# Patient Record
Sex: MALE | URBAN - METROPOLITAN AREA
[De-identification: names, ages, dates, MRNs, and addresses within clinical notes are randomized per-mention and may not be internally consistent; named-entity substitution may affect disease eponyms.]

---

## 2020-10-30 ENCOUNTER — OFFICE VISIT (OUTPATIENT)
Dept: URGENT CARE | Age: 20
End: 2020-10-30

## 2020-10-30 VITALS — TEMPERATURE: 98.5 F | HEART RATE: 86 BPM | OXYGEN SATURATION: 99 % | RESPIRATION RATE: 15 BRPM

## 2020-10-30 DIAGNOSIS — R68.89 FLU-LIKE SYMPTOMS: ICD-10-CM

## 2020-10-30 DIAGNOSIS — Z20.822 COVID-19 RULED OUT: Primary | ICD-10-CM

## 2020-10-30 NOTE — PROGRESS NOTES
10/30/2020    Halle Baum (:  2000) is a 23 y.o.  male, here requesting COVID-19 testing    History of Present Illness  Cough    Visit Vitals  Pulse 86   Temp 98.5 °F (36.9 °C)   Resp 15   SpO2 99%       ASSESSMENT  Screening for COVID-19/ Viral disease    PLAN  POCT influenza testing - Comments: pending,   COVID-19 sample collected and submitted. Patient given detailed CDC instructions contained within After Visit Summary. An  electronic signature was used to authenticate this note.     --Franck Sosa MD

## 2020-10-31 LAB
FLUAV+FLUBV AG NOSE QL IA.RAPID: NEGATIVE POS/NEG
FLUAV+FLUBV AG NOSE QL IA.RAPID: NEGATIVE POS/NEG
VALID INTERNAL CONTROL?: YES

## 2020-11-02 LAB — SARS-COV-2, NAA: NOT DETECTED
